# Patient Record
Sex: FEMALE | Race: WHITE | Employment: UNEMPLOYED | URBAN - METROPOLITAN AREA
[De-identification: names, ages, dates, MRNs, and addresses within clinical notes are randomized per-mention and may not be internally consistent; named-entity substitution may affect disease eponyms.]

---

## 2017-05-05 ENCOUNTER — TELEPHONE (OUTPATIENT)
Dept: OBGYN UNIT | Facility: HOSPITAL | Age: 27
End: 2017-05-05

## 2017-05-09 ENCOUNTER — TELEPHONE (OUTPATIENT)
Dept: OBGYN UNIT | Facility: HOSPITAL | Age: 27
End: 2017-05-09

## 2017-05-09 RX ORDER — URSODIOL 300 MG/1
300 CAPSULE ORAL 2 TIMES DAILY
Status: ON HOLD | COMMUNITY
End: 2017-05-19

## 2017-05-16 ENCOUNTER — HOSPITAL ENCOUNTER (INPATIENT)
Facility: HOSPITAL | Age: 27
LOS: 3 days | Discharge: HOME | End: 2017-05-19
Attending: OBSTETRICS & GYNECOLOGY | Admitting: OBSTETRICS & GYNECOLOGY
Payer: COMMERCIAL

## 2017-05-16 ENCOUNTER — HOSPITAL ENCOUNTER (INPATIENT)
Dept: OBGYN CLINIC | Facility: HOSPITAL | Age: 27
Discharge: HOME | End: 2017-05-16
Payer: COMMERCIAL

## 2017-05-16 PROBLEM — Z34.90 PREGNANCY: Status: ACTIVE | Noted: 2017-05-16

## 2017-05-16 PROCEDURE — 86900 BLOOD TYPING SEROLOGIC ABO: CPT | Performed by: OBSTETRICS & GYNECOLOGY

## 2017-05-16 PROCEDURE — 86901 BLOOD TYPING SEROLOGIC RH(D): CPT | Performed by: OBSTETRICS & GYNECOLOGY

## 2017-05-16 PROCEDURE — 81002 URINALYSIS NONAUTO W/O SCOPE: CPT

## 2017-05-16 PROCEDURE — 3E0P7GC INTRODUCTION OF OTHER THERAPEUTIC SUBSTANCE INTO FEMALE REPRODUCTIVE, VIA NATURAL OR ARTIFICIAL OPENING: ICD-10-PCS | Performed by: OBSTETRICS & GYNECOLOGY

## 2017-05-16 PROCEDURE — 86850 RBC ANTIBODY SCREEN: CPT | Performed by: OBSTETRICS & GYNECOLOGY

## 2017-05-16 PROCEDURE — 86780 TREPONEMA PALLIDUM: CPT | Performed by: OBSTETRICS & GYNECOLOGY

## 2017-05-16 PROCEDURE — 85027 COMPLETE CBC AUTOMATED: CPT | Performed by: OBSTETRICS & GYNECOLOGY

## 2017-05-16 RX ORDER — TERBUTALINE SULFATE 1 MG/ML
0.25 INJECTION, SOLUTION SUBCUTANEOUS AS NEEDED
Status: DISCONTINUED | OUTPATIENT
Start: 2017-05-16 | End: 2017-05-18

## 2017-05-16 RX ORDER — CLINDAMYCIN PHOSPHATE 900 MG/50ML
900 INJECTION INTRAVENOUS EVERY 8 HOURS
Status: DISCONTINUED | OUTPATIENT
Start: 2017-05-16 | End: 2017-05-18

## 2017-05-16 RX ORDER — SODIUM CHLORIDE, SODIUM LACTATE, POTASSIUM CHLORIDE, CALCIUM CHLORIDE 600; 310; 30; 20 MG/100ML; MG/100ML; MG/100ML; MG/100ML
INJECTION, SOLUTION INTRAVENOUS CONTINUOUS
Status: DISCONTINUED | OUTPATIENT
Start: 2017-05-16 | End: 2017-05-18

## 2017-05-16 RX ORDER — IBUPROFEN 600 MG/1
600 TABLET ORAL ONCE AS NEEDED
Status: DISCONTINUED | OUTPATIENT
Start: 2017-05-16 | End: 2017-05-18

## 2017-05-16 RX ORDER — ZOLPIDEM TARTRATE 5 MG/1
5 TABLET ORAL NIGHTLY PRN
Status: DISCONTINUED | OUTPATIENT
Start: 2017-05-16 | End: 2017-05-18

## 2017-05-16 RX ORDER — DEXTROSE, SODIUM CHLORIDE, SODIUM LACTATE, POTASSIUM CHLORIDE, AND CALCIUM CHLORIDE 5; .6; .31; .03; .02 G/100ML; G/100ML; G/100ML; G/100ML; G/100ML
INJECTION, SOLUTION INTRAVENOUS AS NEEDED
Status: DISCONTINUED | OUTPATIENT
Start: 2017-05-16 | End: 2017-05-18

## 2017-05-16 NOTE — PROGRESS NOTES
Pt admitted into 105 for cervidil induction. Pt arrived ambulatory with spouse.   . EDC of 17  History of cholystasis. Patient changed into gown. Urine specimen obtained. EFM tested, calibrated and applied. Current FHT's 125.   Abdomen soft

## 2017-05-17 PROCEDURE — 0HQ9XZZ REPAIR PERINEUM SKIN, EXTERNAL APPROACH: ICD-10-PCS | Performed by: OBSTETRICS & GYNECOLOGY

## 2017-05-17 RX ORDER — EPHEDRINE SULFATE 50 MG/ML
5 INJECTION, SOLUTION INTRAVENOUS AS NEEDED
Status: DISCONTINUED | OUTPATIENT
Start: 2017-05-17 | End: 2017-05-18

## 2017-05-17 RX ORDER — NALBUPHINE HCL 10 MG/ML
2.5 AMPUL (ML) INJECTION
Status: DISCONTINUED | OUTPATIENT
Start: 2017-05-17 | End: 2017-05-19

## 2017-05-17 NOTE — PLAN OF CARE
Problem: SAFETY ADULT - FALL  Goal: Free from fall injury  INTERVENTIONS:  - Assess pt frequently for physical needs  - Identify cognitive and physical deficits and behaviors that affect risk of falls.   - Gail fall precautions as indicated by assessme

## 2017-05-17 NOTE — PLAN OF CARE
Problem: Patient/Family Goals  Goal: Patient/Family Long Term Goal  Patient’s Long Term Goal: Uncomplicated delivery    Interventions:  -   - See additional Care Plan goals for specific interventions   Outcome: Progressing  Goal: Patient/Family Short Term

## 2017-05-18 PROCEDURE — 85025 COMPLETE CBC W/AUTO DIFF WBC: CPT | Performed by: OBSTETRICS & GYNECOLOGY

## 2017-05-18 RX ORDER — HYDROCODONE BITARTRATE AND ACETAMINOPHEN 5; 325 MG/1; MG/1
2 TABLET ORAL EVERY 4 HOURS PRN
Status: DISCONTINUED | OUTPATIENT
Start: 2017-05-18 | End: 2017-05-19

## 2017-05-18 RX ORDER — ZOLPIDEM TARTRATE 5 MG/1
5 TABLET ORAL NIGHTLY PRN
Status: DISCONTINUED | OUTPATIENT
Start: 2017-05-18 | End: 2017-05-19

## 2017-05-18 RX ORDER — HYDROCODONE BITARTRATE AND ACETAMINOPHEN 5; 325 MG/1; MG/1
1 TABLET ORAL EVERY 4 HOURS PRN
Status: DISCONTINUED | OUTPATIENT
Start: 2017-05-18 | End: 2017-05-19

## 2017-05-18 RX ORDER — SIMETHICONE 80 MG
80 TABLET,CHEWABLE ORAL 3 TIMES DAILY PRN
Status: DISCONTINUED | OUTPATIENT
Start: 2017-05-18 | End: 2017-05-19

## 2017-05-18 RX ORDER — IBUPROFEN 600 MG/1
600 TABLET ORAL EVERY 6 HOURS
Status: DISCONTINUED | OUTPATIENT
Start: 2017-05-18 | End: 2017-05-19

## 2017-05-18 RX ORDER — ACETAMINOPHEN 325 MG/1
650 TABLET ORAL EVERY 4 HOURS PRN
Status: DISCONTINUED | OUTPATIENT
Start: 2017-05-18 | End: 2017-05-19

## 2017-05-18 RX ORDER — DOCUSATE SODIUM 100 MG/1
100 CAPSULE, LIQUID FILLED ORAL
Status: DISCONTINUED | OUTPATIENT
Start: 2017-05-18 | End: 2017-05-19

## 2017-05-18 RX ORDER — BISACODYL 10 MG
10 SUPPOSITORY, RECTAL RECTAL ONCE AS NEEDED
Status: ACTIVE | OUTPATIENT
Start: 2017-05-18 | End: 2017-05-18

## 2017-05-18 NOTE — PROGRESS NOTES
Report received from Caresse Hodgkin, [facility]. Assumed care of patient.

## 2017-05-18 NOTE — L&D DELIVERY NOTE
Mother's Information           Danita Douglass  [[id number]]     Labor Events     labor?:  No    steroids?:  None   Cervical ripening date/time:  17   Cervical ripening type:  Cervidil   Antibiotics received during labor?:  Yes   Antibi date/time: 5/18/17 0007   Skin to skin with:   Mother          Vaginal Count    Initial count RN:  Job Shadow   Initial count Tech:  SHANNAN PRECIADO    Initial counts 11   0    Final counts 11   1       Final count RN:  Job Shadow

## 2017-05-18 NOTE — H&P
[facility]    PATIENT'S NAME: Torrence Sandhoff   ATTENDING PHYSICIAN: Lieutenant Charles M.D.    PATIENT ACCOUNT#:   [de-identified]    LOCATION:  [address] [place] [address]  MEDICAL RECORD #:   [id number]       YOB: 1990  ADMISSION DATE:       0 Rubella immune. Pap smear normal.    MEDICATIONS:  Actigall 300 mg p.o. b.i.d., prenatal vitamin. ALLERGIES:  The patient is allergic to Lamictal.  She had a rash. Suprax, she gets a rash. Wellbutrin, a rash. Zoloft, she had suicidal tendencies.   Ember Trevino

## 2017-05-18 NOTE — PROGRESS NOTES
Report given to LUCAS Lewis. Patient, infant, and  transferred to mother baby unit in stable condition via wheelchair.

## 2017-05-18 NOTE — PROGRESS NOTES
PPD #1    Pt without compl. Min lochia and cramping. Breastfeeding.     BP 97/72 mmHg  Pulse 64  Temp(Src) 98.7 °F (37.1 °C) (Oral)  Resp 20  Ht 5' 7\" (1.702 m)  Wt 142 lb (64.411 kg)  BMI 22.24 kg/m2  SpO2 100%  LMP 08/31/2016 (Exact Date)  Breastfeeding

## 2017-05-18 NOTE — PROGRESS NOTES
NURSING ADMISSION NOTE      Patient admitted via Wheelchair  Oriented to room. Safety precautions initiated. Bed in low position. Call light in reach. POC discussed with pt and spouse. Both verbalized understanding.   Hugs and kisses tags in place a

## 2017-05-18 NOTE — L&D DELIVERY NOTE
[facility]    PATIENT'S NAME: Juan Antonio Savage   ATTENDING PHYSICIAN: Fredie Bumpers, M.D.    PATIENT ACCOUNT #: [de-identified] LOCATION:  [address] [place] [address]   MEDICAL RECORD #: [id number] YOB: 1990   ADMISSION DATE: 05/16/2017 DELIVER post antibiotic prophylaxis. PLAN:  Routine postpartum care. Mom and infant, Jeferson Pendleton, presently doing well.     Dictated By Merced Partida M.D.  d: 05/18/2017 00:28:03  t: 05/18/2017 01:21:07  [facility] [id number]  [facility]/

## 2017-05-19 VITALS
RESPIRATION RATE: 18 BRPM | TEMPERATURE: 98 F | HEIGHT: 67 IN | BODY MASS INDEX: 22.29 KG/M2 | WEIGHT: 142 LBS | OXYGEN SATURATION: 100 % | DIASTOLIC BLOOD PRESSURE: 69 MMHG | HEART RATE: 64 BPM | SYSTOLIC BLOOD PRESSURE: 104 MMHG

## 2017-05-19 PROCEDURE — 85025 COMPLETE CBC W/AUTO DIFF WBC: CPT | Performed by: OBSTETRICS & GYNECOLOGY

## 2017-05-19 NOTE — PROGRESS NOTES
PPD2    S: doing well, bleeding minimal , pain ok with motrin, breastfeeding  O: /69 mmHg  Pulse 64  Temp(Src) 97.6 °F (36.4 °C) (Oral)  Resp 18  Ht 5' 7\" (1.702 m)  Wt 142 lb (64.411 kg)  BMI 22.24 kg/m2  SpO2 100%  LMP 08/31/2016 (Exact Date)  Adela

## 2017-05-19 NOTE — PLAN OF CARE
Problem: POSTPARTUM  Goal: Long Term Goal:Experiences normal postpartum course  INTERVENTIONS:  - Assess and monitor vital signs and lab values. - Assess fundus and lochia. - Provide ice/sitz baths for perineum discomfort.   - Monitor healing of incision/ pain/trauma. - Instruct and provide assistance with proper latch. - Review techniques for milk expression (breast pumping) and storage of breast milk. Provide pumping equipment/supplies, instructions and assistance, as needed.   - Encourage rooming-in and Instruct and provide assistance with proper latch. - Review techniques for milk expression (breast pumping) and storage of breast milk. Provide pumping equipment/supplies, instructions and assistance, as needed.   - Encourage rooming-in and breast feeding

## 2017-05-19 NOTE — PLAN OF CARE
Problem: POSTPARTUM  Goal: Optimize infant feeding at the breast  INTERVENTIONS:  - Initiate breast feeding within first hour after birth. - Monitor effectiveness of current breast feeding efforts. - Assess support systems available to mother/family.   - previous experience with breast feeding.  - Provide information as needed about early infant feeding cues (e.g., rooting, lip smacking, sucking fingers/hand) versus late cue of crying.  - Discuss/demonstrate breast feeding aids (e.g., infant sling, nursing

## 2017-05-19 NOTE — PROGRESS NOTES
Discharged to home, accompanied by staff to car, all belongings taken by patient. No questions re: discharge instructions/teaching.

## 2017-05-21 ENCOUNTER — TELEPHONE (OUTPATIENT)
Dept: OBGYN UNIT | Facility: HOSPITAL | Age: 27
End: 2017-05-21

## 2017-05-21 NOTE — PROGRESS NOTES
Outgoing cradle call completed. Mom reports that she and infant are doing well. Is having a little difficulty with getting baby latched. Is pumping her breast and feeding infant pumped breast milk. Will make an appt to see Lc tomorrow.  No complaints of PPB

## 2017-05-23 ENCOUNTER — NURSE ONLY (OUTPATIENT)
Dept: LACTATION | Facility: HOSPITAL | Age: 27
End: 2017-05-23
Attending: OBSTETRICS & GYNECOLOGY
Payer: COMMERCIAL

## 2017-05-23 VITALS — TEMPERATURE: 98 F

## 2017-05-23 PROCEDURE — 99213 OFFICE O/P EST LOW 20 MIN: CPT

## 2017-05-23 NOTE — PATIENT INSTRUCTIONS
Guidelines for Using a Nipple Shield    Refer to the [employer]. These are additional suggestions only.   • This thin silicone nipple shield (size 20mm) has been recommended to assist your baby to latch on to the breast or for protection of without the shield. • When your baby’s swallowing slows on the first side, repeat this process on the other breast.   • If needed, trickle drops of your expressed milk or formula onto the nipple to encourage your baby to latch on.  If your baby becomes ups necessary when using the shield. • Your baby’s weight should be checked 1-2 times each week while using a nipple shield.

## 2017-05-25 ENCOUNTER — APPOINTMENT (OUTPATIENT)
Dept: LACTATION | Facility: HOSPITAL | Age: 27
End: 2017-05-25
Attending: OBSTETRICS & GYNECOLOGY
Payer: COMMERCIAL

## 2017-05-30 ENCOUNTER — NURSE ONLY (OUTPATIENT)
Dept: LACTATION | Facility: HOSPITAL | Age: 27
End: 2017-05-30
Attending: OBSTETRICS & GYNECOLOGY
Payer: COMMERCIAL

## 2017-05-30 PROCEDURE — 99212 OFFICE O/P EST SF 10 MIN: CPT

## 2017-07-12 ENCOUNTER — NURSE ONLY (OUTPATIENT)
Dept: LACTATION | Facility: HOSPITAL | Age: 27
End: 2017-07-12
Attending: OBSTETRICS & GYNECOLOGY
Payer: COMMERCIAL

## 2017-07-12 PROCEDURE — 99212 OFFICE O/P EST SF 10 MIN: CPT

## 2023-05-01 ENCOUNTER — TELEPHONE (OUTPATIENT)
Dept: FAMILY MEDICINE CLINIC | Facility: CLINIC | Age: 33
End: 2023-05-01

## 2023-05-04 ENCOUNTER — PATIENT OUTREACH (OUTPATIENT)
Dept: CASE MANAGEMENT | Age: 33
End: 2023-05-04

## 2023-05-04 NOTE — PROCEDURES
The office order for PCP removal request is Approved and finalized on May 4, 2023.     Thanks,  [facility] [employer]

## (undated) NOTE — IP AVS SNAPSHOT
[facility] [place] [address] [place], [address] ~ [phone]                Discharge Summary   5/16/2017    [facility] [place]           Admission Information        Provider Department    5/16/2017 Vinnie Rene MD  2sw- Follow up with Rosaura Frazier MD. Schedule an appointment as soon as possible for a visit in 6 weeks.     Specialty:  OBSTETRICS & GYNECOLOGY    Contact information:    [address] [place], [place] [address]  [phone]        Future Appointments Patient Belongings       Most Recent Value    All belongings returned to patient at discharge Pt's bedside belongings    Medications Sent Home None to return    Medications Returned:       Patient Education    Lactation Education-Mother Chart  Active   Adela Cervical Ripening Resolved   Safety Resolved   Epidural Information Resolved   Labor Activity Resolved   Review Plan of Care Resolved   Pain Management Resolved   Fetal Monitoring Resolved   Delivery Process Resolved   Tocolytics Resolved   Antibiotics Res

## (undated) NOTE — MR AVS SNAPSHOT
PAT [facility]  [address] [facility] [facility] [address]  [phone]               Thank you for choosing us for your health care visit with Conner Rodriguez.   We are glad to serve you and happy to provide you with this summary ? The use of a nipple shield may decrease your milk supply and could decrease the amount of milk your baby receives. ? Careful follow-up, including weight checks, with your lactation consultant and baby’s health care provider is important.    ? Do not use ? Your nipples are not painful during the feeding. Your baby is content after most feedings.   ? Your baby has adequate diapers (at least 6-8 wet and 3-4 loose, yellow stools every 24 hours by the 4th day of life) AND gains at least 4-8 ounces per week (one prenatal multivitamin plus DHA 27-0.8-228 MG Caps   Take 1 capsule by mouth daily.                    MyChart                  Visit [facility] online at  [email or web address]